# Patient Record
Sex: FEMALE | Race: AMERICAN INDIAN OR ALASKA NATIVE
[De-identification: names, ages, dates, MRNs, and addresses within clinical notes are randomized per-mention and may not be internally consistent; named-entity substitution may affect disease eponyms.]

---

## 2018-04-19 ENCOUNTER — HOSPITAL ENCOUNTER (EMERGENCY)
Dept: HOSPITAL 31 - C.ER | Age: 68
Discharge: HOME | End: 2018-04-19
Payer: MEDICARE

## 2018-04-19 VITALS — HEART RATE: 69 BPM | RESPIRATION RATE: 18 BRPM

## 2018-04-19 VITALS — TEMPERATURE: 98 F

## 2018-04-19 VITALS — DIASTOLIC BLOOD PRESSURE: 73 MMHG | SYSTOLIC BLOOD PRESSURE: 150 MMHG

## 2018-04-19 VITALS — OXYGEN SATURATION: 99 %
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
## 2018-04-19 DIAGNOSIS — T14.8XXA: Primary | ICD-10-CM

## 2018-04-19 DIAGNOSIS — E11.9: ICD-10-CM

## 2018-04-19 DIAGNOSIS — I10: ICD-10-CM

## 2018-04-19 NOTE — C.PDOC
History Of Present Illness





Patient is a 68 y/o F presenting after fall.  Patient reports that she was 

taking her garbage out when she missed the step and fell onto her L side. She 

reports that she has ambulated since the incident.  She reports that she 

previously injured her L knee and would like xrays.  She reports that she has 

chronic lightheadedness that she has spoken to her PMD about and in conflict of 

triage note, no new lightheadedness, chest pain or shortness of breath.





- HPI


Time Seen by Provider: 04/19/18 18:52


Chief Complaint (Nursing): Trauma





Past Medical History


Vital Signs: 


 Last Vital Signs











Temp  98.0 F   04/19/18 20:16


 


Pulse  69   04/19/18 20:16


 


Resp  18   04/19/18 20:16


 


BP  150/73   04/19/18 19:15


 


Pulse Ox  100   04/19/18 20:16














- Medical History


PMH: Anxiety, Arthritis, Diabetes, Diverticulitis, Gastritis, HTN, Pneumonia


   Denies: Chronic Kidney Disease


Surgical History: Cholecystectomy





- [facility] Procedures








EXCIS DEBRIDE OF WOUND, INFECT, OR BURN (06/22/15)


OTHER SKIN & SUBQ I   D (06/22/15)








Family History: States: Unknown Family Hx





- Social History


Hx Tobacco Use: No


Hx Alcohol Use: No


Hx Substance Use: No





- Immunization History


Hx Tetanus Toxoid Vaccination: No


Hx Influenza Vaccination: No


Hx Pneumococcal Vaccination: No





Review Of Systems


Except As Marked, All Systems Reviewed And Found Negative.


Constitutional: Negative for: Fever, Chills


Cardiovascular: Negative for: Chest Pain, Palpitations, Paroxysmal Noc. Dyspnea

, Edema, Light Headedness


Respiratory: Negative for: Cough, Shortness of Breath


Gastrointestinal: Negative for: Nausea, Vomiting, Abdominal Pain, Diarrhea, 

Constipation


Musculoskeletal: Positive for: Leg Pain.  Negative for: Neck Pain, Shoulder Pain


Neurological: Negative for: Weakness, Numbness, Incoordination, Confusion, 

Seizures, Altered Mental Status, Headache





Physical Exam





- Physical Exam


Appears: Well, Non-toxic, No Acute Distress


Skin: Normal Color, Warm


Head: Atraumatic, Normacephalic


Eye(s): bilateral: Normal Inspection, PERRL, EOMI


Neck: No Midline Cervical Tenderness, Supple


Chest: Symmetrical, No Tenderness


Cardiovascular: Rhythm Regular


Respiratory: Normal Breath Sounds


Gastrointestinal/Abdominal: Soft, No Tenderness, No Mass, No Distention


Back: Normal Inspection, No CVA Tenderness, No Vertebral Tenderness


Extremity: Normal ROM, Other (abrasion to R knee, normal ROM and strength)


Extremity: Bilateral: Hips Non-Tender, Normal ROM


Neurological/Psych: Oriented x3, Normal Speech, Normal Cranial Nerves


Gait: Steady (ambulating with cane (baseline))





ED Course And Treatment


O2 Sat by Pulse Oximetry: 99





Medical Decision Making


Medical Decision Making: 





Xray knee and xray tib fib ordered and negative for fracture.  Tetanus updated.

  Patient ambulating around without issue.





Disposition





- Disposition


Disposition: HOME/ ROUTINE


Disposition Time: 20:09


Condition: GOOD


Additional Instructions: 


Follow-up with PMD within 2 days.  Return to ED if condition worsens.  


Instructions:  Preventing Falls in the Older Adult, Contusion (DC)


Forms:  [facility] (English)





- Clinical Impression


Clinical Impression: 


 Contusion, Fall

## 2018-04-20 NOTE — RAD
PROCEDURE:  Left Knee Radiographs.



HISTORY:

Posttraumatic left knee pain



COMPARISON:

None.



FINDINGS:



BONES:

Normal. No fracture. 



JOINTS:

Normal. No osteoarthritis. 



JOINT EFFUSION:

None. 



OTHER FINDINGS:

None.



IMPRESSION:

No acute findings related to/accounting  for the clinical 

presentation.

## 2018-04-20 NOTE — RAD
PROCEDURE:  Radiographs of the left tibia and fibula. 



HISTORY:

Posttraumatic right lower extremity pain 



COMPARISON:

None available.



TECHNIQUE:

Frontal and lateral views obtained. 



FINDINGS:



BONES:

No fracture or destructive lesion.



JOINT SPACES:

Unremarkable.



OTHER FINDINGS:

None.



IMPRESSION:

No acute findings related to/accounting  for the clinical 

presentation.

## 2019-04-28 ENCOUNTER — HOSPITAL ENCOUNTER (EMERGENCY)
Dept: HOSPITAL 42 - ED | Age: 69
Discharge: HOME | End: 2019-04-28
Payer: MEDICARE

## 2019-04-28 VITALS — OXYGEN SATURATION: 99 % | DIASTOLIC BLOOD PRESSURE: 81 MMHG | HEART RATE: 88 BPM | SYSTOLIC BLOOD PRESSURE: 148 MMHG

## 2019-04-28 VITALS — TEMPERATURE: 98.2 F | RESPIRATION RATE: 18 BRPM

## 2019-04-28 DIAGNOSIS — E11.9: ICD-10-CM

## 2019-04-28 DIAGNOSIS — R51: Primary | ICD-10-CM

## 2019-04-28 DIAGNOSIS — I10: ICD-10-CM

## 2019-04-28 LAB
ALBUMIN SERPL-MCNC: 4.2 G/DL (ref 3–4.8)
ALBUMIN/GLOB SERPL: 1.1 {RATIO} (ref 1.1–1.8)
ALT SERPL-CCNC: 14 U/L (ref 7–56)
AST SERPL-CCNC: 24 U/L (ref 14–36)
BASOPHILS # BLD AUTO: 0.02 K/MM3 (ref 0–2)
BASOPHILS NFR BLD: 0.2 % (ref 0–3)
BUN SERPL-MCNC: 19 MG/DL (ref 7–21)
CALCIUM SERPL-MCNC: 9.8 MG/DL (ref 8.4–10.5)
EOSINOPHIL # BLD: 0.1 10*3/UL (ref 0–0.7)
EOSINOPHIL NFR BLD: 1.1 % (ref 1.5–5)
ERYTHROCYTE [DISTWIDTH] IN BLOOD BY AUTOMATED COUNT: 13.4 % (ref 11.5–14.5)
GFR NON-AFRICAN AMERICAN: > 60
HGB BLD-MCNC: 11.6 G/DL (ref 12–16)
LYMPHOCYTES # BLD: 2.4 10*3/UL (ref 1.2–3.4)
LYMPHOCYTES NFR BLD AUTO: 30 % (ref 22–35)
MCH RBC QN AUTO: 30.9 PG (ref 25–35)
MCHC RBC AUTO-ENTMCNC: 32.5 G/DL (ref 31–37)
MCV RBC AUTO: 94.9 FL (ref 80–105)
MONOCYTES # BLD AUTO: 0.6 10*3/UL (ref 0.1–0.6)
MONOCYTES NFR BLD: 7.4 % (ref 1–6)
PLATELET # BLD: 380 10^3/UL (ref 120–450)
PMV BLD AUTO: 10.1 FL (ref 7–11)
RBC # BLD AUTO: 3.76 10^6/UL (ref 3.5–6.1)
TROPONIN I SERPL-MCNC: 0.02 NG/ML
WBC # BLD AUTO: 8 10^3/UL (ref 4.5–11)

## 2019-04-28 PROCEDURE — 84484 ASSAY OF TROPONIN QUANT: CPT

## 2019-04-28 PROCEDURE — 70450 CT HEAD/BRAIN W/O DYE: CPT

## 2019-04-28 PROCEDURE — 83735 ASSAY OF MAGNESIUM: CPT

## 2019-04-28 PROCEDURE — 85025 COMPLETE CBC W/AUTO DIFF WBC: CPT

## 2019-04-28 PROCEDURE — 80053 COMPREHEN METABOLIC PANEL: CPT

## 2019-04-28 PROCEDURE — 93005 ELECTROCARDIOGRAM TRACING: CPT

## 2019-04-28 PROCEDURE — 96374 THER/PROPH/DIAG INJ IV PUSH: CPT

## 2019-04-28 PROCEDURE — 82948 REAGENT STRIP/BLOOD GLUCOSE: CPT

## 2019-04-28 PROCEDURE — 71045 X-RAY EXAM CHEST 1 VIEW: CPT

## 2019-04-28 PROCEDURE — 99285 EMERGENCY DEPT VISIT HI MDM: CPT

## 2019-04-28 NOTE — ED PDOC
Physical Exam





Vital Signs











  Temp Pulse Resp BP Pulse Ox


 


 04/28/19 07:17  98.2 F  93 H  18  132/71  100














Medical Decision Making


ED Course and Treatment: 


04/28/19 07:10





Impression:


A 68 year old female who presents to the ED for a headache with associated left 

side numbness/tingling.





Differential Diagnosis included but are not limited to:  





Plan:





-- Reassess and disposition





Prior Visits:


Notes and results from previous visits were reviewed. 





Progress Notes:


04/28/19 07:10


Patient signed out to me by Dr. Gomez. Pending chemistry and social work. 





04/28/19 07:56


CT Head W/O contrast


IMPRESSION: 


1. There is generalized parenchymal atrophy. 


2. 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 


3. No acute intracranial pathology.


 


04/28/19 08:05


Patient requests to go home, states she no longer needs social work support, has

spoken to adult protective services and will speak to son. Patient also reports 

she feels safe at home, will get restraining order against granddaughters. 

Patient has involved the police in the past but will not press charges at this 

time.





Patient no longer has headache or tingling sensation. Her neuro exam in normal. 

She will f/u up with her primary care doctor Dr. Evaristo Jones. She was advised to 

return to the ED if symptoms worsen or any other concerns.








- Lab Interpretations


Lab Results: 





                                        











Troponin I  0.02 ng/mL  04/28/19  06:30    








                                        











Total Bilirubin  0.8 mg/dL (0.2-1.3)   04/28/19  06:30    


 


AST  24 U/L (14-36)   04/28/19  06:30    


 


ALT  14 U/L (7-56)   04/28/19  06:30    


 


Alkaline Phosphatase  85 U/L ()   04/28/19  06:30    


 


Total Protein  7.8 g/dL (5.8-8.3)   04/28/19  06:30    


 


Albumin  4.2 g/dL (3.0-4.8)   04/28/19  06:30    


 


Globulin  3.6 gm/dL  04/28/19  06:30    


 


Albumin/Globulin Ratio  1.1  (1.1-1.8)   04/28/19  06:30    














- RAD Interpretation


Radiology Orders: 











04/28/19 04:10


HEAD W/O CONTRAST [CT] Stat 





04/28/19 04:11


CHEST PORTABLE [RAD] Stat 














- Medication Orders


Current Medication Orders: 











Sodium Chloride (Sodium Chloride 0.9%)  1,000 mls @ 100 mls/hr IV .Q10H BILLIE


   Last Admin: 04/28/19 06:42  Dose: 100 mls/hr





eMAR Start Stop


 Document     04/28/19 06:42  RD  (Rec: 04/28/19 06:42  RD  [id number])


     Intravenous Solution


      Start Date                                 04/28/19


      Start Time                                 06:42








Discontinued Medications





Acetaminophen (Tylenol 325mg Tab)  650 mg PO STAT STA


   Stop: 04/28/19 04:12


   Last Admin: 04/28/19 05:50  Dose: 650 mg





MAR Pain/Vitals


 Document     04/28/19 05:50  RD  (Rec: 04/28/19 06:42  RD  [id number])


     Pain Reassessment


      Is This A Pain ReAssessment?               No


     Sleep


      Is patient sleeping during reassessment?   No


     Presence of Pain


      Presence of Pain                           Yes





Diphenhydramine HCl (Benadryl)  25 mg IVP STAT STA


   Stop: 04/28/19 04:12


   Last Admin: 04/28/19 06:40  Dose: 25 mg





IVP Administration


 Document     04/28/19 06:40  RD  (Rec: 04/28/19 06:42  RD  [id number])


     Charges for Administration


      # of IVP Administrations                   1














- Scribe Statement


The provider has reviewed the documentation as recorded by the Jenn Nath 





Provider Scribe Attestation:


All medical record entries made by the Scribe were at my direction and 

personally dictated by me. I have reviewed the chart and agree that the record 

accurately reflects my personal performance of the history, physical exam, 

medical decision making, and the department course for this patient. I have also

 personally directed, reviewed, and agree with the discharge instructions and 

disposition.








Disposition/Present on Arrival





- Present on Arrival


Any Indicators Present on Arrival: No


History of DVT/PE: No


History of Uncontrolled Diabetes: No


Urinary Catheter: No


History of Decub. Ulcer: No


History Surgical Site Infection Following: None





- Disposition


Have Diagnosis and Disposition been Completed?: Yes


Diagnosis: 


 Headache





Disposition: HOME/ ROUTINE


Disposition Time: 08:05


Patient Plan: Discharge


Patient Problems: 


                             Current Active Problems











Problem Status Onset


 


Headache Acute 











Condition: IMPROVED


Discharge Instructions (ExitCare):  Tension Headache (DC)


Additional Instructions: 





URIAH SEQUEIRA, thank you for letting us take care of you today. Your provider 

was Wood Swift DO and you were treated for Headache. The emergency 

medical care you received today was directed at your acute symptoms. If you were

 prescribed any medication, please fill it and take as directed. It may take 

several days for your symptoms to resolve. Return to the Emergency Department if

 your symptoms worsen, do not improve, or if you have any other problems.





Please contact your doctor or call one of the physicians/clinics you have been 

referred to that are listed on the Patient Visit Information form that is 

included in your discharge packet. Bring any paperwork you were given at 

discharge with you along with any medications you are taking to your follow up 

visit. Our treatment cannot replace ongoing medical care by a primary care 

provider outside of the emergency department.





Thank you for allowing the [email or web address] team to be part of your care today.








If you had an X-Ray or CT scan: A Radiologist will review the ED reading if any 

change in treatment is needed we will contact you.***





If you had a blood, urine, or wound culture: It will take several days for the 

results, if any change in treatment is needed we will contact you.***





If you had an STI test: It will take 48 hours for the results. Please call after

 1 week if you have not heard back.***


Referrals: 


Alina Urena MD [Family Provider] - Follow up with primary


Forms:  [facility] (English), WORK NOTE

## 2019-04-28 NOTE — RAD
Date of service: 



04/28/2019



HISTORY:

 cp 



COMPARISON:

No prior. 



TECHNIQUE:

1 view obtained.



FINDINGS:



LUNGS:

Right suprahilar nodular density measuring 2.5 x 2.1 cm.  Right 

midlung nodular density measuring 2.4 x 2.1 cm.  Left perihilar 

midlung density measuring 2.8 x 2.2 cm.  No focal consolidation.



PLEURA:

No significant pleural effusion identified, no pneumothorax apparent.



CARDIOVASCULAR:

Aortic atherosclerotic calcifications.  Cardiomediastinal silhouette 

enlarged.  Prominent left hilum. 



OSSEOUS STRUCTURES:

Spinal degenerative changes.



VISUALIZED UPPER ABDOMEN:

Normal.



OTHER FINDINGS:

None.



IMPRESSION:

Bilateral nodular densities measuring up to 2.8 cm.  CT scan is 

recommended for further evaluation.  



No focal consolidation or pleural effusion.

## 2019-04-28 NOTE — CT
Date of service: 



04/28/2019



PROCEDURE:  CT HEAD WITHOUT CONTRAST.



HISTORY:

ha



COMPARISON:

None available.



TECHNIQUE:

Axial computed tomography images were obtained through the head/brain 

without intravenous contrast.  



Radiation dose:



Total exam DLP = 812.73 mGy-cm.



This CT exam was performed using one or more of the following dose 

reduction techniques: Automated exposure control, adjustment of the 

mA and/or kV according to patient size, and/or use of iterative 

reconstruction technique.



FINDINGS:



HEMORRHAGE:

No intracranial hemorrhage. 



BRAIN:

No mass effect or edema.  Mild atrophy.  Chronic microvascular 

ischemic changes.



VENTRICLES:

Unremarkable. No hydrocephalus. 



CALVARIUM:

Unremarkable.



PARANASAL SINUSES:

Trace right maxillary sinus secretions.



MASTOID AIR CELLS:

Unremarkable as visualized. No inflammatory changes.



OTHER FINDINGS:

None.



IMPRESSION:

No acute intracranial pathology.  



Trace right maxillary sinus disease.

## 2019-04-28 NOTE — CARD
--------------- APPROVED REPORT --------------





Date of service: 04/28/2019



EKG Measurement

Heart [id number]

[id number]

[id number]

[id number]

[id number]



<Conclusion>

Normal sinus rhythm

Moderate voltage criteria for LVH, may be normal variant

Prolonged QT

Abnormal ECG
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
